# Patient Record
Sex: FEMALE | Race: WHITE | Employment: OTHER | URBAN - METROPOLITAN AREA
[De-identification: names, ages, dates, MRNs, and addresses within clinical notes are randomized per-mention and may not be internally consistent; named-entity substitution may affect disease eponyms.]

---

## 2021-01-01 ENCOUNTER — APPOINTMENT (OUTPATIENT)
Dept: ULTRASOUND IMAGING | Facility: HOSPITAL | Age: 76
End: 2021-01-01
Attending: HOSPITALIST
Payer: MEDICARE

## 2021-01-01 ENCOUNTER — PATIENT MESSAGE (OUTPATIENT)
Dept: INTERNAL MEDICINE CLINIC | Facility: CLINIC | Age: 76
End: 2021-01-01

## 2021-01-01 ENCOUNTER — APPOINTMENT (OUTPATIENT)
Dept: CV DIAGNOSTICS | Facility: HOSPITAL | Age: 76
End: 2021-01-01
Attending: INTERNAL MEDICINE
Payer: MEDICARE

## 2021-01-01 ENCOUNTER — MED REC SCAN ONLY (OUTPATIENT)
Dept: INTERNAL MEDICINE CLINIC | Facility: CLINIC | Age: 76
End: 2021-01-01

## 2021-01-01 ENCOUNTER — PATIENT OUTREACH (OUTPATIENT)
Dept: CASE MANAGEMENT | Age: 76
End: 2021-01-01

## 2021-01-01 ENCOUNTER — APPOINTMENT (OUTPATIENT)
Dept: GENERAL RADIOLOGY | Facility: HOSPITAL | Age: 76
End: 2021-01-01
Attending: EMERGENCY MEDICINE
Payer: MEDICARE

## 2021-01-01 ENCOUNTER — APPOINTMENT (OUTPATIENT)
Dept: GENERAL RADIOLOGY | Facility: HOSPITAL | Age: 76
End: 2021-01-01
Attending: INTERNAL MEDICINE
Payer: MEDICARE

## 2021-01-01 ENCOUNTER — TELEPHONE (OUTPATIENT)
Dept: CARDIOLOGY | Age: 76
End: 2021-01-01

## 2021-01-01 ENCOUNTER — HOSPITAL ENCOUNTER (OUTPATIENT)
Facility: HOSPITAL | Age: 76
Setting detail: OBSERVATION
Discharge: HOME HEALTH | End: 2021-01-01
Attending: EMERGENCY MEDICINE | Admitting: HOSPITALIST
Payer: MEDICARE

## 2021-01-01 ENCOUNTER — HOSPITAL ENCOUNTER (OUTPATIENT)
Facility: HOSPITAL | Age: 76
Setting detail: OBSERVATION
Discharge: HOME | End: 2021-01-01
Attending: INTERNAL MEDICINE | Admitting: HOSPITALIST
Payer: MEDICARE

## 2021-01-01 ENCOUNTER — ANTI-COAG (OUTPATIENT)
Dept: CARDIOLOGY | Age: 76
End: 2021-01-01

## 2021-01-01 ENCOUNTER — OFFICE VISIT (OUTPATIENT)
Dept: INTERNAL MEDICINE CLINIC | Facility: CLINIC | Age: 76
End: 2021-01-01
Payer: MEDICARE

## 2021-01-01 ENCOUNTER — TELEPHONE (OUTPATIENT)
Dept: INTERNAL MEDICINE CLINIC | Facility: CLINIC | Age: 76
End: 2021-01-01

## 2021-01-01 VITALS
TEMPERATURE: 98 F | RESPIRATION RATE: 18 BRPM | BODY MASS INDEX: 24 KG/M2 | SYSTOLIC BLOOD PRESSURE: 151 MMHG | HEART RATE: 60 BPM | WEIGHT: 137.56 LBS | OXYGEN SATURATION: 96 % | DIASTOLIC BLOOD PRESSURE: 51 MMHG

## 2021-01-01 VITALS
HEART RATE: 52 BPM | WEIGHT: 142.88 LBS | HEIGHT: 63.5 IN | TEMPERATURE: 98 F | OXYGEN SATURATION: 97 % | BODY MASS INDEX: 25 KG/M2 | RESPIRATION RATE: 14 BRPM | SYSTOLIC BLOOD PRESSURE: 162 MMHG | DIASTOLIC BLOOD PRESSURE: 94 MMHG

## 2021-01-01 VITALS
HEIGHT: 63 IN | HEART RATE: 87 BPM | RESPIRATION RATE: 16 BRPM | OXYGEN SATURATION: 94 % | WEIGHT: 144.38 LBS | SYSTOLIC BLOOD PRESSURE: 161 MMHG | DIASTOLIC BLOOD PRESSURE: 67 MMHG | BODY MASS INDEX: 25.58 KG/M2 | TEMPERATURE: 99 F

## 2021-01-01 DIAGNOSIS — I25.10 CORONARY ARTERY DISEASE INVOLVING NATIVE CORONARY ARTERY OF NATIVE HEART WITHOUT ANGINA PECTORIS: ICD-10-CM

## 2021-01-01 DIAGNOSIS — E03.9 ACQUIRED HYPOTHYROIDISM: ICD-10-CM

## 2021-01-01 DIAGNOSIS — I50.31 ACUTE DIASTOLIC CONGESTIVE HEART FAILURE (HCC): Primary | ICD-10-CM

## 2021-01-01 DIAGNOSIS — E78.5 HYPERLIPIDEMIA, UNSPECIFIED HYPERLIPIDEMIA TYPE: Primary | ICD-10-CM

## 2021-01-01 DIAGNOSIS — Z86.018 HISTORY OF MENINGIOMA: ICD-10-CM

## 2021-01-01 DIAGNOSIS — I51.81 TAKOTSUBO CARDIOMYOPATHY: ICD-10-CM

## 2021-01-01 DIAGNOSIS — Z02.9 ENCOUNTERS FOR UNSPECIFIED ADMINISTRATIVE PURPOSE: ICD-10-CM

## 2021-01-01 DIAGNOSIS — D51.8 OTHER VITAMIN B12 DEFICIENCY ANEMIA: ICD-10-CM

## 2021-01-01 DIAGNOSIS — I10 ESSENTIAL HYPERTENSION: ICD-10-CM

## 2021-01-01 DIAGNOSIS — I50.9 ACUTE CONGESTIVE HEART FAILURE, UNSPECIFIED HEART FAILURE TYPE (HCC): Primary | ICD-10-CM

## 2021-01-01 DIAGNOSIS — F03.90 DEMENTIA WITHOUT BEHAVIORAL DISTURBANCE, UNSPECIFIED DEMENTIA TYPE (HCC): ICD-10-CM

## 2021-01-01 DIAGNOSIS — G62.9 NEUROPATHY: ICD-10-CM

## 2021-01-01 DIAGNOSIS — Z79.01 LONG TERM (CURRENT) USE OF ANTICOAGULANTS: Primary | ICD-10-CM

## 2021-01-01 DIAGNOSIS — E11.9 TYPE 2 DIABETES MELLITUS WITHOUT COMPLICATION, WITHOUT LONG-TERM CURRENT USE OF INSULIN (HCC): ICD-10-CM

## 2021-01-01 DIAGNOSIS — F32.4 MAJOR DEPRESSIVE DISORDER IN PARTIAL REMISSION, UNSPECIFIED WHETHER RECURRENT (HCC): ICD-10-CM

## 2021-01-01 DIAGNOSIS — I87.2 VENOUS INSUFFICIENCY: ICD-10-CM

## 2021-01-01 DIAGNOSIS — I35.1 NONRHEUMATIC AORTIC VALVE INSUFFICIENCY: ICD-10-CM

## 2021-01-01 DIAGNOSIS — I50.9 CONGESTIVE HEART FAILURE, UNSPECIFIED HF CHRONICITY, UNSPECIFIED HEART FAILURE TYPE (HCC): Primary | ICD-10-CM

## 2021-01-01 DIAGNOSIS — I48.0 PAF (PAROXYSMAL ATRIAL FIBRILLATION) (HCC): ICD-10-CM

## 2021-01-01 DIAGNOSIS — Z79.01 LONG TERM (CURRENT) USE OF ANTICOAGULANTS: ICD-10-CM

## 2021-01-01 DIAGNOSIS — Z23 NEED FOR VACCINATION: ICD-10-CM

## 2021-01-01 DIAGNOSIS — E11.42 TYPE 2 DIABETES MELLITUS WITH DIABETIC POLYNEUROPATHY, WITHOUT LONG-TERM CURRENT USE OF INSULIN (HCC): ICD-10-CM

## 2021-01-01 DIAGNOSIS — G20 PARKINSON'S DISEASE (HCC): ICD-10-CM

## 2021-01-01 DIAGNOSIS — I35.8 AORTIC VALVE SCLEROSIS: ICD-10-CM

## 2021-01-01 DIAGNOSIS — I51.89 DIASTOLIC DYSFUNCTION: ICD-10-CM

## 2021-01-01 DIAGNOSIS — E11.59 TYPE 2 DIABETES MELLITUS WITH OTHER CIRCULATORY COMPLICATION, WITHOUT LONG-TERM CURRENT USE OF INSULIN (HCC): ICD-10-CM

## 2021-01-01 LAB
ALBUMIN SERPL-MCNC: 3.2 G/DL (ref 3.4–5)
ALBUMIN SERPL-MCNC: 3.4 G/DL (ref 3.4–5)
ALBUMIN/GLOB SERPL: 0.8 {RATIO} (ref 1–2)
ALBUMIN/GLOB SERPL: 0.8 {RATIO} (ref 1–2)
ALP LIVER SERPL-CCNC: 113 U/L
ALP LIVER SERPL-CCNC: 88 U/L
ALT SERPL-CCNC: 15 U/L
ALT SERPL-CCNC: 15 U/L
ANION GAP SERPL CALC-SCNC: 10 MMOL/L (ref 0–18)
ANION GAP SERPL CALC-SCNC: 10 MMOL/L (ref 0–18)
ANION GAP SERPL CALC-SCNC: 11 MMOL/L (ref 0–18)
ANION GAP SERPL CALC-SCNC: 12 MMOL/L (ref 0–18)
ANION GAP SERPL CALC-SCNC: 12 MMOL/L (ref 0–18)
ANION GAP SERPL CALC-SCNC: 8 MMOL/L (ref 0–18)
ANION GAP SERPL CALC-SCNC: 9 MMOL/L (ref 0–18)
APTT PPP: 39 SECONDS (ref 25.4–36.1)
AST SERPL-CCNC: 15 U/L (ref 15–37)
AST SERPL-CCNC: 18 U/L (ref 15–37)
ATRIAL RATE: 129 BPM
ATRIAL RATE: 57 BPM
ATRIAL RATE: 72 BPM
ATRIAL RATE: 81 BPM
ATRIAL RATE: 91 BPM
ATRIAL RATE: 97 BPM
BASOPHILS # BLD AUTO: 0.03 X10(3) UL (ref 0–0.2)
BASOPHILS # BLD AUTO: 0.17 X10(3) UL (ref 0–0.2)
BASOPHILS # BLD AUTO: 0.21 X10(3) UL (ref 0–0.2)
BASOPHILS NFR BLD AUTO: 0.3 %
BASOPHILS NFR BLD AUTO: 1.3 %
BASOPHILS NFR BLD AUTO: 1.6 %
BILIRUB SERPL-MCNC: 0.3 MG/DL (ref 0.1–2)
BILIRUB SERPL-MCNC: 0.4 MG/DL (ref 0.1–2)
BILIRUB UR QL STRIP.AUTO: NEGATIVE
BUN BLD-MCNC: 21 MG/DL (ref 7–18)
BUN BLD-MCNC: 22 MG/DL (ref 7–18)
BUN BLD-MCNC: 23 MG/DL (ref 7–18)
BUN BLD-MCNC: 25 MG/DL (ref 7–18)
BUN BLD-MCNC: 26 MG/DL (ref 7–18)
BUN/CREAT SERPL: 11.2 (ref 10–20)
BUN/CREAT SERPL: 11.3 (ref 10–20)
BUN/CREAT SERPL: 11.8 (ref 10–20)
BUN/CREAT SERPL: 12.5 (ref 10–20)
BUN/CREAT SERPL: 12.6 (ref 10–20)
BUN/CREAT SERPL: 15.7 (ref 10–20)
BUN/CREAT SERPL: 16 (ref 10–20)
CALCIUM BLD-MCNC: 8.2 MG/DL (ref 8.5–10.1)
CALCIUM BLD-MCNC: 8.4 MG/DL (ref 8.5–10.1)
CALCIUM BLD-MCNC: 8.5 MG/DL (ref 8.5–10.1)
CALCIUM BLD-MCNC: 8.8 MG/DL (ref 8.5–10.1)
CALCIUM BLD-MCNC: 8.9 MG/DL (ref 8.5–10.1)
CHLORIDE SERPL-SCNC: 92 MMOL/L (ref 98–112)
CHLORIDE SERPL-SCNC: 92 MMOL/L (ref 98–112)
CHLORIDE SERPL-SCNC: 95 MMOL/L (ref 98–112)
CHLORIDE SERPL-SCNC: 96 MMOL/L (ref 98–112)
CHLORIDE SERPL-SCNC: 98 MMOL/L (ref 98–112)
CO2 SERPL-SCNC: 18 MMOL/L (ref 21–32)
CO2 SERPL-SCNC: 21 MMOL/L (ref 21–32)
CO2 SERPL-SCNC: 22 MMOL/L (ref 21–32)
CO2 SERPL-SCNC: 23 MMOL/L (ref 21–32)
CO2 SERPL-SCNC: 24 MMOL/L (ref 21–32)
CO2 SERPL-SCNC: 26 MMOL/L (ref 21–32)
CO2 SERPL-SCNC: 26 MMOL/L (ref 21–32)
COLOR UR AUTO: YELLOW
CREAT BLD-MCNC: 1.59 MG/DL
CREAT BLD-MCNC: 1.63 MG/DL
CREAT BLD-MCNC: 1.74 MG/DL
CREAT BLD-MCNC: 1.84 MG/DL
CREAT BLD-MCNC: 1.87 MG/DL
CREAT BLD-MCNC: 1.95 MG/DL
CREAT BLD-MCNC: 2.03 MG/DL
DEPRECATED RDW RBC AUTO: 42.4 FL (ref 35.1–46.3)
DEPRECATED RDW RBC AUTO: 44.4 FL (ref 35.1–46.3)
DEPRECATED RDW RBC AUTO: 46.1 FL (ref 35.1–46.3)
EOSINOPHIL # BLD AUTO: 0 X10(3) UL (ref 0–0.7)
EOSINOPHIL # BLD AUTO: 0.69 X10(3) UL (ref 0–0.7)
EOSINOPHIL # BLD AUTO: 0.91 X10(3) UL (ref 0–0.7)
EOSINOPHIL NFR BLD AUTO: 0 %
EOSINOPHIL NFR BLD AUTO: 5.4 %
EOSINOPHIL NFR BLD AUTO: 7 %
ERYTHROCYTE [DISTWIDTH] IN BLOOD BY AUTOMATED COUNT: 12.8 % (ref 11–15)
ERYTHROCYTE [DISTWIDTH] IN BLOOD BY AUTOMATED COUNT: 13.2 % (ref 11–15)
ERYTHROCYTE [DISTWIDTH] IN BLOOD BY AUTOMATED COUNT: 13.6 % (ref 11–15)
EST. AVERAGE GLUCOSE BLD GHB EST-MCNC: 123 MG/DL (ref 68–126)
GLOBULIN PLAS-MCNC: 4 G/DL (ref 2.8–4.4)
GLOBULIN PLAS-MCNC: 4.1 G/DL (ref 2.8–4.4)
GLUCOSE BLD-MCNC: 100 MG/DL (ref 70–99)
GLUCOSE BLD-MCNC: 103 MG/DL (ref 70–99)
GLUCOSE BLD-MCNC: 104 MG/DL (ref 70–99)
GLUCOSE BLD-MCNC: 105 MG/DL (ref 70–99)
GLUCOSE BLD-MCNC: 107 MG/DL (ref 70–99)
GLUCOSE BLD-MCNC: 108 MG/DL (ref 70–99)
GLUCOSE BLD-MCNC: 112 MG/DL (ref 70–99)
GLUCOSE BLD-MCNC: 114 MG/DL (ref 70–99)
GLUCOSE BLD-MCNC: 115 MG/DL (ref 70–99)
GLUCOSE BLD-MCNC: 121 MG/DL (ref 70–99)
GLUCOSE BLD-MCNC: 122 MG/DL (ref 70–99)
GLUCOSE BLD-MCNC: 124 MG/DL (ref 70–99)
GLUCOSE BLD-MCNC: 125 MG/DL (ref 70–99)
GLUCOSE BLD-MCNC: 130 MG/DL (ref 70–99)
GLUCOSE BLD-MCNC: 132 MG/DL (ref 70–99)
GLUCOSE BLD-MCNC: 132 MG/DL (ref 70–99)
GLUCOSE BLD-MCNC: 134 MG/DL (ref 70–99)
GLUCOSE BLD-MCNC: 136 MG/DL (ref 70–99)
GLUCOSE BLD-MCNC: 138 MG/DL (ref 70–99)
GLUCOSE BLD-MCNC: 140 MG/DL (ref 70–99)
GLUCOSE BLD-MCNC: 143 MG/DL (ref 70–99)
GLUCOSE BLD-MCNC: 152 MG/DL (ref 70–99)
GLUCOSE BLD-MCNC: 154 MG/DL (ref 70–99)
GLUCOSE BLD-MCNC: 161 MG/DL (ref 70–99)
GLUCOSE BLD-MCNC: 168 MG/DL (ref 70–99)
GLUCOSE BLD-MCNC: 175 MG/DL (ref 70–99)
GLUCOSE BLD-MCNC: 181 MG/DL (ref 70–99)
GLUCOSE BLD-MCNC: 182 MG/DL (ref 70–99)
GLUCOSE BLD-MCNC: 93 MG/DL (ref 70–99)
GLUCOSE BLD-MCNC: 96 MG/DL (ref 70–99)
GLUCOSE BLD-MCNC: 97 MG/DL (ref 70–99)
GLUCOSE UR STRIP.AUTO-MCNC: NEGATIVE MG/DL
HAV IGM SER QL: 2.1 MG/DL (ref 1.6–2.6)
HAV IGM SER QL: 2.1 MG/DL (ref 1.6–2.6)
HBA1C MFR BLD HPLC: 5.9 % (ref ?–5.7)
HCT VFR BLD AUTO: 27.9 %
HCT VFR BLD AUTO: 29.7 %
HCT VFR BLD AUTO: 30.1 %
HGB BLD-MCNC: 10 G/DL
HGB BLD-MCNC: 10.1 G/DL
HGB BLD-MCNC: 9.2 G/DL
IMM GRANULOCYTES # BLD AUTO: 0.02 X10(3) UL (ref 0–1)
IMM GRANULOCYTES # BLD AUTO: 0.07 X10(3) UL (ref 0–1)
IMM GRANULOCYTES # BLD AUTO: 0.07 X10(3) UL (ref 0–1)
IMM GRANULOCYTES NFR BLD: 0.2 %
IMM GRANULOCYTES NFR BLD: 0.5 %
IMM GRANULOCYTES NFR BLD: 0.6 %
INR BLD: 1.78 (ref 0.89–1.11)
INR BLD: 1.87 (ref 0.89–1.11)
INR BLD: 2.02 (ref 0.89–1.11)
INR BLD: 2.22 (ref 0.89–1.11)
INR BLD: 2.35 (ref 0.89–1.11)
INR BLD: 2.98 (ref 0.89–1.11)
INR BLD: 3.08 (ref 0.89–1.11)
INR PPP: 2
INR PPP: 2.35
KETONES UR STRIP.AUTO-MCNC: NEGATIVE MG/DL
LYMPHOCYTES # BLD AUTO: 1.26 X10(3) UL (ref 1–4)
LYMPHOCYTES # BLD AUTO: 2.09 X10(3) UL (ref 1–4)
LYMPHOCYTES # BLD AUTO: 2.62 X10(3) UL (ref 1–4)
LYMPHOCYTES NFR BLD AUTO: 10.9 %
LYMPHOCYTES NFR BLD AUTO: 16.2 %
LYMPHOCYTES NFR BLD AUTO: 20.2 %
M PROTEIN MFR SERPL ELPH: 7.2 G/DL (ref 6.4–8.2)
M PROTEIN MFR SERPL ELPH: 7.5 G/DL (ref 6.4–8.2)
MCH RBC QN AUTO: 30.5 PG (ref 26–34)
MCH RBC QN AUTO: 30.7 PG (ref 26–34)
MCH RBC QN AUTO: 30.7 PG (ref 26–34)
MCHC RBC AUTO-ENTMCNC: 33 G/DL (ref 31–37)
MCHC RBC AUTO-ENTMCNC: 33.6 G/DL (ref 31–37)
MCHC RBC AUTO-ENTMCNC: 33.7 G/DL (ref 31–37)
MCV RBC AUTO: 91.1 FL
MCV RBC AUTO: 91.5 FL
MCV RBC AUTO: 92.4 FL
MONOCYTES # BLD AUTO: 0.75 X10(3) UL (ref 0.1–1)
MONOCYTES # BLD AUTO: 1.05 X10(3) UL (ref 0.1–1)
MONOCYTES # BLD AUTO: 1.27 X10(3) UL (ref 0.1–1)
MONOCYTES NFR BLD AUTO: 6.5 %
MONOCYTES NFR BLD AUTO: 8.1 %
MONOCYTES NFR BLD AUTO: 9.9 %
NEUTROPHILS # BLD AUTO: 8.16 X10 (3) UL (ref 1.5–7.7)
NEUTROPHILS # BLD AUTO: 8.16 X10(3) UL (ref 1.5–7.7)
NEUTROPHILS # BLD AUTO: 8.59 X10 (3) UL (ref 1.5–7.7)
NEUTROPHILS # BLD AUTO: 8.59 X10(3) UL (ref 1.5–7.7)
NEUTROPHILS # BLD AUTO: 9.44 X10 (3) UL (ref 1.5–7.7)
NEUTROPHILS # BLD AUTO: 9.44 X10(3) UL (ref 1.5–7.7)
NEUTROPHILS NFR BLD AUTO: 62.9 %
NEUTROPHILS NFR BLD AUTO: 66.7 %
NEUTROPHILS NFR BLD AUTO: 81.7 %
NITRITE UR QL STRIP.AUTO: NEGATIVE
NT-PROBNP SERPL-MCNC: 5391 PG/ML (ref ?–125)
NT-PROBNP SERPL-MCNC: 8573 PG/ML (ref ?–125)
OSMOLALITY SERPL CALC.SUM OF ELEC: 265 MOSM/KG (ref 275–295)
OSMOLALITY SERPL CALC.SUM OF ELEC: 270 MOSM/KG (ref 275–295)
OSMOLALITY SERPL CALC.SUM OF ELEC: 271 MOSM/KG (ref 275–295)
OSMOLALITY SERPL CALC.SUM OF ELEC: 271 MOSM/KG (ref 275–295)
OSMOLALITY SERPL CALC.SUM OF ELEC: 273 MOSM/KG (ref 275–295)
P AXIS: 56 DEGREES
P AXIS: 57 DEGREES
P AXIS: 59 DEGREES
P AXIS: 68 DEGREES
P-R INTERVAL: 158 MS
P-R INTERVAL: 174 MS
P-R INTERVAL: 176 MS
P-R INTERVAL: 190 MS
PH UR STRIP.AUTO: 6 [PH] (ref 5–8)
PLATELET # BLD AUTO: 458 10(3)UL (ref 150–450)
PLATELET # BLD AUTO: 468 10(3)UL (ref 150–450)
PLATELET # BLD AUTO: 548 10(3)UL (ref 150–450)
POTASSIUM SERPL-SCNC: 3 MMOL/L (ref 3.5–5.1)
POTASSIUM SERPL-SCNC: 3.1 MMOL/L (ref 3.5–5.1)
POTASSIUM SERPL-SCNC: 3.1 MMOL/L (ref 3.5–5.1)
POTASSIUM SERPL-SCNC: 3.3 MMOL/L (ref 3.5–5.1)
POTASSIUM SERPL-SCNC: 3.4 MMOL/L (ref 3.5–5.1)
POTASSIUM SERPL-SCNC: 3.7 MMOL/L (ref 3.5–5.1)
POTASSIUM SERPL-SCNC: 3.8 MMOL/L (ref 3.5–5.1)
POTASSIUM SERPL-SCNC: 3.9 MMOL/L (ref 3.5–5.1)
POTASSIUM SERPL-SCNC: 3.9 MMOL/L (ref 3.5–5.1)
PROCALCITONIN SERPL-MCNC: 0.05 NG/ML (ref ?–0.16)
PROCALCITONIN SERPL-MCNC: <0.05 NG/ML (ref ?–0.16)
PROT UR STRIP.AUTO-MCNC: >=500 MG/DL
PSA SERPL DL<=0.01 NG/ML-MCNC: 21.2 SECONDS (ref 12.4–14.6)
PSA SERPL DL<=0.01 NG/ML-MCNC: 22 SECONDS (ref 12.4–14.6)
PSA SERPL DL<=0.01 NG/ML-MCNC: 23.4 SECONDS (ref 12.4–14.6)
PSA SERPL DL<=0.01 NG/ML-MCNC: 25.2 SECONDS (ref 12.4–14.6)
PSA SERPL DL<=0.01 NG/ML-MCNC: 26.3 SECONDS (ref 12.4–14.6)
PSA SERPL DL<=0.01 NG/ML-MCNC: 31.7 SECONDS (ref 12.4–14.6)
PSA SERPL DL<=0.01 NG/ML-MCNC: 32.5 SECONDS (ref 12.4–14.6)
Q-T INTERVAL: 306 MS
Q-T INTERVAL: 346 MS
Q-T INTERVAL: 356 MS
Q-T INTERVAL: 408 MS
Q-T INTERVAL: 448 MS
Q-T INTERVAL: 518 MS
QRS DURATION: 86 MS
QRS DURATION: 86 MS
QRS DURATION: 90 MS
QRS DURATION: 90 MS
QRS DURATION: 94 MS
QRS DURATION: 94 MS
QTC CALCULATION (BEZET): 425 MS
QTC CALCULATION (BEZET): 455 MS
QTC CALCULATION (BEZET): 473 MS
QTC CALCULATION (BEZET): 490 MS
QTC CALCULATION (BEZET): 500 MS
QTC CALCULATION (BEZET): 504 MS
R AXIS: 54 DEGREES
R AXIS: 56 DEGREES
R AXIS: 61 DEGREES
R AXIS: 63 DEGREES
R AXIS: 64 DEGREES
R AXIS: 64 DEGREES
RBC # BLD AUTO: 3.02 X10(6)UL
RBC # BLD AUTO: 3.26 X10(6)UL
RBC # BLD AUTO: 3.29 X10(6)UL
RBC UR QL AUTO: NEGATIVE
SARS-COV-2 RNA RESP QL NAA+PROBE: NOT DETECTED
SARS-COV-2 RNA RESP QL NAA+PROBE: NOT DETECTED
SODIUM SERPL-SCNC: 126 MMOL/L (ref 136–145)
SODIUM SERPL-SCNC: 126 MMOL/L (ref 136–145)
SODIUM SERPL-SCNC: 128 MMOL/L (ref 136–145)
SODIUM SERPL-SCNC: 128 MMOL/L (ref 136–145)
SODIUM SERPL-SCNC: 129 MMOL/L (ref 136–145)
SP GR UR STRIP.AUTO: 1.01 (ref 1–1.03)
T AXIS: -69 DEGREES
T AXIS: -79 DEGREES
T AXIS: 40 DEGREES
T AXIS: 41 DEGREES
T AXIS: 50 DEGREES
T AXIS: 65 DEGREES
TROPONIN I SERPL-MCNC: <0.045 NG/ML (ref ?–0.04)
UROBILINOGEN UR STRIP.AUTO-MCNC: <2 MG/DL
VENTRICULAR RATE: 119 BPM
VENTRICULAR RATE: 133 BPM
VENTRICULAR RATE: 57 BPM
VENTRICULAR RATE: 72 BPM
VENTRICULAR RATE: 81 BPM
VENTRICULAR RATE: 91 BPM
WBC # BLD AUTO: 11.6 X10(3) UL (ref 4–11)
WBC # BLD AUTO: 12.9 X10(3) UL (ref 4–11)
WBC # BLD AUTO: 13 X10(3) UL (ref 4–11)

## 2021-01-01 PROCEDURE — 3077F SYST BP >= 140 MM HG: CPT | Performed by: INTERNAL MEDICINE

## 2021-01-01 PROCEDURE — 99225 SUBSEQUENT OBSERVATION CARE: CPT | Performed by: INTERNAL MEDICINE

## 2021-01-01 PROCEDURE — 99214 OFFICE O/P EST MOD 30 MIN: CPT | Performed by: INTERNAL MEDICINE

## 2021-01-01 PROCEDURE — 76700 US EXAM ABDOM COMPLETE: CPT | Performed by: HOSPITALIST

## 2021-01-01 PROCEDURE — 3008F BODY MASS INDEX DOCD: CPT | Performed by: INTERNAL MEDICINE

## 2021-01-01 PROCEDURE — 99217 OBSERVATION CARE DISCHARGE: CPT | Performed by: HOSPITALIST

## 2021-01-01 PROCEDURE — 1111F DSCHRG MED/CURRENT MED MERGE: CPT

## 2021-01-01 PROCEDURE — 71045 X-RAY EXAM CHEST 1 VIEW: CPT | Performed by: EMERGENCY MEDICINE

## 2021-01-01 PROCEDURE — 99217 OBSERVATION CARE DISCHARGE: CPT | Performed by: INTERNAL MEDICINE

## 2021-01-01 PROCEDURE — 99226 SUBSEQUENT OBSERVATION CARE: CPT | Performed by: HOSPITALIST

## 2021-01-01 PROCEDURE — 93306 TTE W/DOPPLER COMPLETE: CPT | Performed by: INTERNAL MEDICINE

## 2021-01-01 PROCEDURE — 99225 SUBSEQUENT OBSERVATION CARE: CPT | Performed by: HOSPITALIST

## 2021-01-01 PROCEDURE — 99205 OFFICE O/P NEW HI 60 MIN: CPT | Performed by: INTERNAL MEDICINE

## 2021-01-01 PROCEDURE — 3080F DIAST BP >= 90 MM HG: CPT | Performed by: INTERNAL MEDICINE

## 2021-01-01 PROCEDURE — 99220 INITIAL OBSERVATION CARE,LEVL III: CPT | Performed by: HOSPITALIST

## 2021-01-01 PROCEDURE — 99215 OFFICE O/P EST HI 40 MIN: CPT | Performed by: INTERNAL MEDICINE

## 2021-01-01 PROCEDURE — 99220 INITIAL OBSERVATION CARE,LEVL III: CPT | Performed by: INTERNAL MEDICINE

## 2021-01-01 PROCEDURE — 74230 X-RAY XM SWLNG FUNCJ C+: CPT | Performed by: INTERNAL MEDICINE

## 2021-01-01 RX ORDER — POLYETHYLENE GLYCOL 3350 17 G/17G
17 POWDER, FOR SOLUTION ORAL DAILY PRN
Status: DISCONTINUED | OUTPATIENT
Start: 2021-01-01 | End: 2021-01-01

## 2021-01-01 RX ORDER — EZETIMIBE 10 MG/1
10 TABLET ORAL NIGHTLY
COMMUNITY
Start: 2021-01-01

## 2021-01-01 RX ORDER — QUETIAPINE 25 MG/1
25 TABLET, FILM COATED ORAL NIGHTLY
Status: DISCONTINUED | OUTPATIENT
Start: 2021-01-01 | End: 2021-01-01

## 2021-01-01 RX ORDER — WARFARIN SODIUM 5 MG/1
5 TABLET ORAL
Status: COMPLETED | OUTPATIENT
Start: 2021-01-01 | End: 2021-01-01

## 2021-01-01 RX ORDER — SUCRALFATE ORAL 1 G/10ML
1 SUSPENSION ORAL
Status: DISCONTINUED | OUTPATIENT
Start: 2021-01-01 | End: 2021-01-01

## 2021-01-01 RX ORDER — AMIODARONE HYDROCHLORIDE 200 MG/1
400 TABLET ORAL 2 TIMES DAILY WITH MEALS
Status: COMPLETED | OUTPATIENT
Start: 2021-01-01 | End: 2021-01-01

## 2021-01-01 RX ORDER — RIVASTIGMINE 4.6 MG/24H
1 PATCH, EXTENDED RELEASE TRANSDERMAL DAILY
COMMUNITY
Start: 2021-01-01

## 2021-01-01 RX ORDER — DOCUSATE SODIUM 100 MG/1
1 CAPSULE, LIQUID FILLED ORAL NIGHTLY
COMMUNITY

## 2021-01-01 RX ORDER — BUSPIRONE HYDROCHLORIDE 7.5 MG/1
1 TABLET ORAL 3 TIMES DAILY
COMMUNITY

## 2021-01-01 RX ORDER — DILTIAZEM HYDROCHLORIDE 5 MG/ML
10 INJECTION INTRAVENOUS ONCE
Status: COMPLETED | OUTPATIENT
Start: 2021-01-01 | End: 2021-01-01

## 2021-01-01 RX ORDER — WARFARIN SODIUM 2.5 MG/1
2.5 TABLET ORAL
Status: DISCONTINUED | OUTPATIENT
Start: 2021-01-01 | End: 2021-01-01

## 2021-01-01 RX ORDER — HYDROCODONE BITARTRATE AND ACETAMINOPHEN 5; 325 MG/1; MG/1
1 TABLET ORAL ONCE
Status: COMPLETED | OUTPATIENT
Start: 2021-01-01 | End: 2021-01-01

## 2021-01-01 RX ORDER — HYDRALAZINE HYDROCHLORIDE 50 MG/1
50 TABLET, FILM COATED ORAL EVERY 8 HOURS SCHEDULED
Status: DISCONTINUED | OUTPATIENT
Start: 2021-01-01 | End: 2021-01-01

## 2021-01-01 RX ORDER — PANTOPRAZOLE SODIUM 20 MG/1
20 TABLET, DELAYED RELEASE ORAL
Status: DISCONTINUED | OUTPATIENT
Start: 2021-01-01 | End: 2021-01-01

## 2021-01-01 RX ORDER — AMIODARONE HYDROCHLORIDE 200 MG/1
200 TABLET ORAL DAILY
Status: DISCONTINUED | OUTPATIENT
Start: 2021-01-01 | End: 2021-01-01

## 2021-01-01 RX ORDER — MAGNESIUM OXIDE 400 MG (241.3 MG MAGNESIUM) TABLET
400 TABLET DAILY
Status: DISCONTINUED | OUTPATIENT
Start: 2021-01-01 | End: 2021-01-01

## 2021-01-01 RX ORDER — GABAPENTIN 100 MG/1
100 CAPSULE ORAL EVERY MORNING
Status: DISCONTINUED | OUTPATIENT
Start: 2021-01-01 | End: 2021-01-01

## 2021-01-01 RX ORDER — AMIODARONE HYDROCHLORIDE 200 MG/1
200 TABLET ORAL DAILY
Qty: 30 TABLET | Refills: 3 | Status: SHIPPED | OUTPATIENT
Start: 2021-01-01

## 2021-01-01 RX ORDER — FUROSEMIDE 10 MG/ML
60 INJECTION INTRAMUSCULAR; INTRAVENOUS
Status: DISCONTINUED | OUTPATIENT
Start: 2021-01-01 | End: 2021-01-01

## 2021-01-01 RX ORDER — ASPIRIN 81 MG/1
1 TABLET ORAL DAILY
COMMUNITY

## 2021-01-01 RX ORDER — POTASSIUM CHLORIDE 20 MEQ/1
40 TABLET, EXTENDED RELEASE ORAL EVERY 4 HOURS
Status: COMPLETED | OUTPATIENT
Start: 2021-01-01 | End: 2021-01-01

## 2021-01-01 RX ORDER — GLIPIZIDE 2.5 MG/1
2.5 TABLET, EXTENDED RELEASE ORAL 2 TIMES DAILY
COMMUNITY

## 2021-01-01 RX ORDER — FUROSEMIDE 20 MG/1
20 TABLET ORAL DAILY
Qty: 30 TABLET | Refills: 1 | Status: SHIPPED | OUTPATIENT
Start: 2021-01-01

## 2021-01-01 RX ORDER — AMLODIPINE BESYLATE 10 MG/1
10 TABLET ORAL DAILY
COMMUNITY
Start: 2021-01-01

## 2021-01-01 RX ORDER — BISACODYL 10 MG
10 SUPPOSITORY, RECTAL RECTAL
Status: DISCONTINUED | OUTPATIENT
Start: 2021-01-01 | End: 2021-01-01

## 2021-01-01 RX ORDER — ASPIRIN 81 MG/1
81 TABLET ORAL DAILY
Status: DISCONTINUED | OUTPATIENT
Start: 2021-01-01 | End: 2021-01-01

## 2021-01-01 RX ORDER — POTASSIUM CHLORIDE 20 MEQ/1
40 TABLET, EXTENDED RELEASE ORAL EVERY 4 HOURS
Status: DISPENSED | OUTPATIENT
Start: 2021-01-01 | End: 2021-01-01

## 2021-01-01 RX ORDER — DEXTROSE MONOHYDRATE 25 G/50ML
50 INJECTION, SOLUTION INTRAVENOUS
Status: DISCONTINUED | OUTPATIENT
Start: 2021-01-01 | End: 2021-01-01

## 2021-01-01 RX ORDER — MELATONIN
800 DAILY
Status: DISCONTINUED | OUTPATIENT
Start: 2021-01-01 | End: 2021-01-01

## 2021-01-01 RX ORDER — METOPROLOL SUCCINATE AND HYDROCHLOROTHIAZIDE 12.5; 5 MG/1; MG/1
1 TABLET ORAL NIGHTLY
Status: DISCONTINUED | OUTPATIENT
Start: 2021-01-01 | End: 2021-01-01 | Stop reason: RX

## 2021-01-01 RX ORDER — EZETIMIBE 10 MG/1
10 TABLET ORAL NIGHTLY
Status: DISCONTINUED | OUTPATIENT
Start: 2021-01-01 | End: 2021-01-01

## 2021-01-01 RX ORDER — HYDRALAZINE HYDROCHLORIDE 50 MG/1
50 TABLET, FILM COATED ORAL EVERY 8 HOURS SCHEDULED
Qty: 90 TABLET | Refills: 3 | Status: SHIPPED | OUTPATIENT
Start: 2021-01-01

## 2021-01-01 RX ORDER — FUROSEMIDE 10 MG/ML
40 INJECTION INTRAMUSCULAR; INTRAVENOUS DAILY
Status: DISCONTINUED | OUTPATIENT
Start: 2021-01-01 | End: 2021-01-01

## 2021-01-01 RX ORDER — FUROSEMIDE 10 MG/ML
40 INJECTION INTRAMUSCULAR; INTRAVENOUS ONCE
Status: COMPLETED | OUTPATIENT
Start: 2021-01-01 | End: 2021-01-01

## 2021-01-01 RX ORDER — AMLODIPINE BESYLATE 5 MG/1
10 TABLET ORAL DAILY
Status: DISCONTINUED | OUTPATIENT
Start: 2021-01-01 | End: 2021-01-01

## 2021-01-01 RX ORDER — MONTELUKAST SODIUM 10 MG/1
10 TABLET ORAL NIGHTLY
Status: DISCONTINUED | OUTPATIENT
Start: 2021-01-01 | End: 2021-01-01

## 2021-01-01 RX ORDER — ONDANSETRON 2 MG/ML
4 INJECTION INTRAMUSCULAR; INTRAVENOUS EVERY 6 HOURS PRN
Status: DISCONTINUED | OUTPATIENT
Start: 2021-01-01 | End: 2021-01-01

## 2021-01-01 RX ORDER — MAGNESIUM OXIDE 400 MG (241.3 MG MAGNESIUM) TABLET
1 TABLET DAILY
COMMUNITY

## 2021-01-01 RX ORDER — POTASSIUM CHLORIDE 20 MEQ/1
40 TABLET, EXTENDED RELEASE ORAL ONCE
Status: COMPLETED | OUTPATIENT
Start: 2021-01-01 | End: 2021-01-01

## 2021-01-01 RX ORDER — FUROSEMIDE 10 MG/ML
20 INJECTION INTRAMUSCULAR; INTRAVENOUS
Status: DISCONTINUED | OUTPATIENT
Start: 2021-01-01 | End: 2021-01-01

## 2021-01-01 RX ORDER — HYDROCHLOROTHIAZIDE 12.5 MG/1
12.5 CAPSULE, GELATIN COATED ORAL DAILY
Status: DISCONTINUED | OUTPATIENT
Start: 2021-01-01 | End: 2021-01-01

## 2021-01-01 RX ORDER — BUSPIRONE HYDROCHLORIDE 15 MG/1
7.5 TABLET ORAL 3 TIMES DAILY
Status: DISCONTINUED | OUTPATIENT
Start: 2021-01-01 | End: 2021-01-01

## 2021-01-01 RX ORDER — MAGNESIUM OXIDE 400 MG (241.3 MG MAGNESIUM) TABLET
1 TABLET NIGHTLY PRN
Status: DISCONTINUED | OUTPATIENT
Start: 2021-01-01 | End: 2021-01-01

## 2021-01-01 RX ORDER — LEVOTHYROXINE SODIUM 0.05 MG/1
50 TABLET ORAL
Status: DISCONTINUED | OUTPATIENT
Start: 2021-01-01 | End: 2021-01-01

## 2021-01-01 RX ORDER — DOCUSATE SODIUM 100 MG/1
100 CAPSULE, LIQUID FILLED ORAL NIGHTLY
Status: DISCONTINUED | OUTPATIENT
Start: 2021-01-01 | End: 2021-01-01

## 2021-01-01 RX ORDER — ACETAMINOPHEN 500 MG
500 TABLET ORAL EVERY 6 HOURS PRN
Status: DISCONTINUED | OUTPATIENT
Start: 2021-01-01 | End: 2021-01-01

## 2021-01-01 RX ORDER — RIVASTIGMINE 4.6 MG/24H
1 PATCH, EXTENDED RELEASE TRANSDERMAL DAILY
Status: DISCONTINUED | OUTPATIENT
Start: 2021-01-01 | End: 2021-01-01

## 2021-01-01 RX ORDER — GABAPENTIN 300 MG/1
300 CAPSULE ORAL NIGHTLY
Status: DISCONTINUED | OUTPATIENT
Start: 2021-01-01 | End: 2021-01-01

## 2021-01-01 RX ORDER — LORAZEPAM 2 MG/ML
0.5 INJECTION INTRAMUSCULAR 2 TIMES DAILY PRN
Status: DISCONTINUED | OUTPATIENT
Start: 2021-01-01 | End: 2021-01-01

## 2021-01-01 RX ORDER — METOCLOPRAMIDE HYDROCHLORIDE 5 MG/ML
5 INJECTION INTRAMUSCULAR; INTRAVENOUS EVERY 8 HOURS PRN
Status: DISCONTINUED | OUTPATIENT
Start: 2021-01-01 | End: 2021-01-01

## 2021-01-01 RX ORDER — POTASSIUM CHLORIDE 1.5 G/1.77G
40 POWDER, FOR SOLUTION ORAL ONCE
Status: COMPLETED | OUTPATIENT
Start: 2021-01-01 | End: 2021-01-01

## 2021-01-01 RX ORDER — OMEPRAZOLE 20 MG/1
20 CAPSULE, DELAYED RELEASE ORAL 2 TIMES DAILY
COMMUNITY
Start: 2021-01-01

## 2021-01-01 RX ORDER — ESCITALOPRAM OXALATE 10 MG/1
15 TABLET ORAL DAILY
COMMUNITY
Start: 2021-01-01

## 2021-01-01 RX ORDER — LEVOTHYROXINE SODIUM 0.05 MG/1
50 TABLET ORAL DAILY
COMMUNITY
Start: 2021-01-01

## 2021-01-01 RX ORDER — MONTELUKAST SODIUM 10 MG/1
1 TABLET ORAL NIGHTLY
COMMUNITY

## 2021-01-01 RX ORDER — POTASSIUM CHLORIDE 20 MEQ/1
40 TABLET, EXTENDED RELEASE ORAL EVERY 4 HOURS
Status: DISCONTINUED | OUTPATIENT
Start: 2021-01-01 | End: 2021-01-01

## 2021-01-01 RX ORDER — POTASSIUM CHLORIDE 14.9 MG/ML
20 INJECTION INTRAVENOUS ONCE
Status: COMPLETED | OUTPATIENT
Start: 2021-01-01 | End: 2021-01-01

## 2021-01-01 RX ORDER — WARFARIN SODIUM 5 MG/1
5 TABLET ORAL NIGHTLY
Status: DISCONTINUED | OUTPATIENT
Start: 2021-01-01 | End: 2021-01-01

## 2021-01-01 RX ORDER — ACETAMINOPHEN 325 MG/1
650 TABLET ORAL EVERY 6 HOURS PRN
Status: DISCONTINUED | OUTPATIENT
Start: 2021-01-01 | End: 2021-01-01

## 2021-01-01 RX ORDER — METOPROLOL SUCCINATE AND HYDROCHLOROTHIAZIDE 12.5; 5 MG/1; MG/1
1 TABLET ORAL NIGHTLY
COMMUNITY

## 2021-01-01 RX ORDER — FUROSEMIDE 20 MG/1
20 TABLET ORAL DAILY
Status: DISCONTINUED | OUTPATIENT
Start: 2021-01-01 | End: 2021-01-01

## 2021-01-01 RX ORDER — LEVOTHYROXINE SODIUM 0.05 MG/1
50 TABLET ORAL DAILY
Status: DISCONTINUED | OUTPATIENT
Start: 2021-01-01 | End: 2021-01-01

## 2021-01-01 RX ORDER — AMLODIPINE BESYLATE 5 MG/1
10 TABLET ORAL NIGHTLY
Status: DISCONTINUED | OUTPATIENT
Start: 2021-01-01 | End: 2021-01-01

## 2021-01-01 RX ORDER — ALENDRONATE SODIUM 70 MG/1
70 TABLET ORAL WEEKLY
COMMUNITY
Start: 2021-01-01

## 2021-01-01 RX ORDER — GABAPENTIN 100 MG/1
100 CAPSULE ORAL EVERY MORNING
COMMUNITY
Start: 2021-01-01

## 2021-01-01 RX ORDER — SIMETHICONE 80 MG
80 TABLET,CHEWABLE ORAL 4 TIMES DAILY PRN
Status: DISCONTINUED | OUTPATIENT
Start: 2021-01-01 | End: 2021-01-01

## 2021-01-01 RX ORDER — DILTIAZEM HYDROCHLORIDE 5 MG/ML
10 INJECTION INTRAVENOUS ONCE
Status: DISCONTINUED | OUTPATIENT
Start: 2021-01-01 | End: 2021-01-01

## 2021-01-01 RX ORDER — PROPRANOLOL/HYDROCHLOROTHIAZID 40 MG-25MG
1 TABLET ORAL DAILY
COMMUNITY

## 2021-01-01 RX ORDER — HYDRALAZINE HYDROCHLORIDE 25 MG/1
25 TABLET, FILM COATED ORAL EVERY 8 HOURS SCHEDULED
Status: DISCONTINUED | OUTPATIENT
Start: 2021-01-01 | End: 2021-01-01

## 2021-01-01 RX ORDER — TEMAZEPAM 7.5 MG/1
7.5 CAPSULE ORAL ONCE
Status: COMPLETED | OUTPATIENT
Start: 2021-01-01 | End: 2021-01-01

## 2021-01-01 RX ORDER — DILTIAZEM HYDROCHLORIDE 5 MG/ML
INJECTION INTRAVENOUS
Status: COMPLETED
Start: 2021-01-01 | End: 2021-01-01

## 2021-01-01 RX ORDER — HYDRALAZINE HYDROCHLORIDE 20 MG/ML
10 INJECTION INTRAMUSCULAR; INTRAVENOUS
Status: DISCONTINUED | OUTPATIENT
Start: 2021-01-01 | End: 2021-01-01

## 2021-01-01 RX ORDER — METOPROLOL SUCCINATE 50 MG/1
50 TABLET, EXTENDED RELEASE ORAL DAILY
Status: DISCONTINUED | OUTPATIENT
Start: 2021-01-01 | End: 2021-01-01

## 2021-01-01 RX ORDER — WARFARIN SODIUM 5 MG/1
TABLET ORAL DAILY
COMMUNITY
Start: 2021-01-01

## 2021-03-04 PROBLEM — I51.89 DIASTOLIC DYSFUNCTION: Status: ACTIVE | Noted: 2021-01-01

## 2021-03-04 PROBLEM — D64.9 ABSOLUTE ANEMIA: Status: ACTIVE | Noted: 2021-01-01

## 2021-03-04 PROBLEM — G62.9 NEUROPATHY: Status: ACTIVE | Noted: 2021-01-01

## 2021-03-04 PROBLEM — E11.9 DIABETES MELLITUS, TYPE 2 (HCC): Chronic | Status: ACTIVE | Noted: 2021-01-01

## 2021-03-04 PROBLEM — I48.0 PAF (PAROXYSMAL ATRIAL FIBRILLATION) (HCC): Status: ACTIVE | Noted: 2021-01-01

## 2021-03-04 PROBLEM — G20 PARKINSON'S DISEASE (HCC): Status: ACTIVE | Noted: 2021-01-01

## 2021-03-04 PROBLEM — I35.1 NONRHEUMATIC AORTIC VALVE INSUFFICIENCY: Status: ACTIVE | Noted: 2021-01-01

## 2021-03-04 PROBLEM — F32.4 MAJOR DEPRESSION IN PARTIAL REMISSION (HCC): Chronic | Status: ACTIVE | Noted: 2021-01-01

## 2021-03-04 PROBLEM — E03.9 ACQUIRED HYPOTHYROIDISM: Status: ACTIVE | Noted: 2021-01-01

## 2021-03-04 PROBLEM — I87.2 VENOUS INSUFFICIENCY: Status: ACTIVE | Noted: 2021-01-01

## 2021-03-04 PROBLEM — Z86.018 HISTORY OF MENINGIOMA: Status: ACTIVE | Noted: 2021-01-01

## 2021-03-04 PROBLEM — I35.8 AORTIC VALVE SCLEROSIS: Status: ACTIVE | Noted: 2021-01-01

## 2021-03-04 PROBLEM — F03.90 DEMENTIA (HCC): Chronic | Status: ACTIVE | Noted: 2021-01-01

## 2021-03-04 NOTE — PROGRESS NOTES
[facility]    CHIEF COMPLAINT:  Patient presents with:  Establish Care: New Patient  Medication Follow-Up: Multiple Issues        HISTORY OF PRESENT ILLNESS:  The patient is a 77year old year old female who presents with long complicated medical essential hypertension    • Venous insufficiency 3/4/2021        Past Surgical History:   Past Surgical History:   Procedure Laterality Date   • CHOLECYSTECTOMY  2011   • COLONOSCOPY     • [address] [facility] with [facility]    pt believes with shunt placemen (FIBER-CAPS OR) Take 1 capsule by mouth daily. • busPIRone HCl 7.5 MG Oral Tab Take 1 tablet by mouth 3 (three) times daily. • amLODIPine Besylate 10 MG Oral Tab Take 10 mg by mouth daily.      • alendronate 70 MG Oral Tab Take 70 mg by mouth once a on phone: Not on file        Gets together: Not on file        Attends Rastafarian service: Not on file        Active member of club or organization: Not on file        Attends meetings of clubs or organizations: Not on file        Relationship status: Not o 5' 3.5\" (1.613 m)   Wt 142 lb 14.4 oz (64.8 kg)   SpO2 97%   BMI 24.92 kg/m²  Body mass index is 24.92 kg/m².    GENERAL: well developed, well nourished,in no apparent distress  SKIN: no rashes,no suspicious lesions  HEENT: atraumatic, normocephalic,  NECK gait worsening as is dementia  - NEURO - INTERNAL    13. Other vitamin B12 deficiency anemia- on orals, no labs avaiable  - IRON AND TIBC; Future  - FERRITIN; Future  - VITAMIN B12; Future  - CBC WITH DIFFERENTIAL WITH PLATELET; Future    14.  Venous insuff

## 2021-03-05 NOTE — TELEPHONE ENCOUNTER
Incoming (mail or fax):  fax  Received from:  [facility]  Documentation given to:  [facility]

## 2021-03-07 PROBLEM — I50.9 ACUTE CONGESTIVE HEART FAILURE, UNSPECIFIED HEART FAILURE TYPE (HCC): Status: ACTIVE | Noted: 2021-01-01

## 2021-03-07 PROBLEM — I50.9 ACUTE CONGESTIVE HEART FAILURE (HCC): Status: ACTIVE | Noted: 2021-01-01

## 2021-03-07 NOTE — PROGRESS NOTES
03/07/21 1306   Clinical Encounter Type   Visited With Health care provider  ( clarified A/D request as updated POLST (currently patient has a [place] POLST on file) and that POA forms are not necessary.)   Routine Visit Introduction  (POA not

## 2021-03-07 NOTE — PLAN OF CARE
Pt is alert x 4, can be forgetful, on 2 liters of O2, bedside swallow evaluation passed, SB on the monitor. PT denies any cardiovascular symptoms at this time. Pt is up with SBA x 2, pt has a shuffling gait, incontinent of bladder.  All needs met and will c ADULT  Goal: Maintains optimal cardiac output and hemodynamic stability  Description: INTERVENTIONS:  - Monitor vital signs, rhythm, and trends  - Monitor for bleeding, hypotension and signs of decreased cardiac output  - Evaluate effectiveness of vasoacti

## 2021-03-07 NOTE — PROGRESS NOTES
[facility] Pharmacy Note:  Renal Dose Adjustment for Metoclopramide (REGLAN)    Tomer Alexander has been prescribed Metoclopramide (REGLAN) 10 mg every 8 hours as needed for nausea    Estimated Creatinine Clearance: 21.8 mL/min (A) (based on SCr of 1.87 mg/dL (H)).

## 2021-03-07 NOTE — CONSULTS
[facility]  [facility] Cardiology Consult Note    Pema Jacobs Patient Status:  Observation    1945 MRN [id number]   [facility] 2NE-A Attending Jevon Mcelroy MD   Hosp Day # 0 PCP Eric Joya MD     78 yo female admitte stress-induced cardiomypathy, resolved   • Dementia ([facility]    • Depression    • Diabetes mellitus, type 2 (pick complications) ([facility] 4/9/7760    With neuropathy and PAD   • DM2 (diabetes mellitus, type 2) ([facility] 11/21/2014   • Fall    • High blood pressure clubbing, cyanosis or edema. Peripheral pulses are 2+. Neurologic: Alert and oriented, normal affect. Skin: Warm and dry.        Pau Gonzalez MD  3/7/2021  10:33 AM

## 2021-03-07 NOTE — H&P
[facility]  History and Physical     Georgia Cid Patient Status:  Emergency    1945 MRN [id number]   Location [address] Attending Galina Smith MD   Hosp Day # 0 PCP Nancy Woodson MD     Chief Compla 11/21/2014   • Fall    • HTN (hypertension) 11/21/2014   • Hyperlipidemia 11/21/2014    Intolerant of statins   • Hypothyroidism    • Major depression in partial remission ([facility] 3/4/2021   • Neuropathy 3/4/2021   • Osteoarthritis    • Other and unspecified ezetimibe 10 MG Oral Tab, Take 10 mg by mouth nightly., Disp: , Rfl:   escitalopram 10 MG Oral Tab, Take 15 mg by mouth daily. , Disp: , Rfl:   docusate sodium 100 MG Oral Cap, Take 1 capsule by mouth nightly., Disp: , Rfl:   Cyanocobalamin (VITAMIN B 12 all extremities. Extremities: trace edema. No cyanosis. Integument: No rashes or lesions. Psychiatric: Appropriate mood and affect.   Diagnostic Data:    Labs:  Recent Labs   Lab 03/07/21  0702   WBC 13.0*   HGB 10.1*   MCV 91.5   .0*   INR 1.78*

## 2021-03-07 NOTE — ED INITIAL ASSESSMENT (HPI)
Patient here from home with increased SOB over the last 2 days. Patient was found to be 92% on room air by EMS. Patient has had a cough. Denies fever.   Patient AOx3  Denies pain

## 2021-03-07 NOTE — PROGRESS NOTES
03/07/21 0935 03/07/21 0939 03/07/21 0943   Vital Signs   Pulse 56 61 59   Heart Rate Source Monitor Monitor Monitor   Resp 18 18 18   Respiratory Quality Normal Normal Normal   BP (!) 186/73 (!) 197/87 (!) 172/70   MAP (mmHg) 101 108 93   BP Location R

## 2021-03-07 NOTE — ED PROVIDER NOTES
Patient Seen in: [facility] Emergency Department      History   Patient presents with:  Difficulty Breathing    Stated Complaint: GALEN    HPI/Subjective:   HPI    Patient 52-year-old female with a history of coronary disease among other medical probl STENT  2002   • SHOULDER ARTHROSCOPY     • SPLENECTOMY  1960                Social History    Tobacco Use      Smoking status: Former Smoker      Smokeless tobacco: Never Used    Vaping Use      Vaping Use: Never used    Alcohol use: Yes      Comment: soci Coordination: Coordination normal.   Psychiatric:         Behavior: Behavior normal.              ED Course     Labs Reviewed   COMP METABOLIC PANEL (14) - Abnormal; Notable for the following components:       Result Value    Sodium 129 (*)     Potassium 3 -----------         ------                     CBC W/ DIFFERENTIAL[[id number]]          Abnormal            Final result                 Please view results for these tests on the individual orders.    PROCALCITONIN   RAINBOW DRAW by (CST): Britton Hashimoto, DO on 3/07/2021 at 7:55 AM         Finalized by (CST): Britton Hashimoto, DO on 3/07/2021 at 7:57 AM                 MDM      Patient was brought back to the examination room and examined immediately due  patient's complex condition.   Lisa Kong

## 2021-03-08 NOTE — CM/SW NOTE
03/08/21 1600   CM/SW Screening   Referral [facility] staff; Chart review;Nursing rounds   Patient's Mental Status Alert;Confused;Memory Impairments   Patient's Home Environment House   Number of Levels in Home 1   Toby

## 2021-03-08 NOTE — PLAN OF CARE
RN SHIFT NOTE    Assumed care of pt at 0700. Pt is alert and oriented x 2 w/ forgetfulness. Reminders and reorientation completed and continuing to monitor. Pt complained of headache and PRN tylenol was administered (see MAR). Continuing to monitor.  Pt is safety including physical limitations  - Instruct pt to call for assistance with activity based on assessment  - Modify environment to reduce risk of injury  - Provide assistive devices as appropriate  - Consider OT/PT consult to assist with strengthening/

## 2021-03-08 NOTE — PROGRESS NOTES
03/08/21 1534   Clinical Encounter Type   Visited With Health care provider  Abiola Ye RN)   Continue Visiting No   Responded to consult. Scanned POLST into EMR as received on 3/8/2021.  remains available for spiritual care at pager [phone].

## 2021-03-08 NOTE — PROGRESS NOTES
[facility] Cardiology Progress Note    Patient seen and examined.  Chart reviewed.      No chest pain or shortness of breath. Still confused and sleepy. Had delirium yesterday.   Per daughter patient with Parkinson's dementia and frequent episodes of delirium ev oxide (MAG-OX) tab 400 mg, 400 mg, Oral, Daily  Montelukast Sodium (SINGULAIR) tab 10 mg, 10 mg, Oral, Nightly  Pantoprazole Sodium (PROTONIX) EC tab 20 mg, 20 mg, Oral, QAM AC  rivastigmine (EXELON) 4.6 MG/24HR patch 1 patch, 1 patch, Transdermal, Daily current cardiac regimen  3. HTN - increase hydralazine dosing to 50 mg TID. Continue amlodipine and IV lasix  4.  Pafib - continue oral anticoagulation  5.  UTI - per primary team   - on IV antibiotics    Will follow,    Sammie Hendrickson MD

## 2021-03-08 NOTE — PROGRESS NOTES
[facility]  Progress Note     Zelalem Mccoy Patient Status:  Observation    1945 MRN [id number]   [facility] 2NE-A Attending Lisa Stephens MD   Hosp Day # 0 PCP Precious Billy MD     Chief Complaint: SOB   S:  Rina Wheatley 168 hours. Inflammatory Markers  No results for input(s): CRP, KATY, LDH, DDIMER in the last 168 hours. Imaging: Imaging data reviewed in Epic.   Medications:   • hydrALAzine HCl  50 mg Oral Q8H [address]   • potassium chloride  40 mEq Intravenous Once    Fol Dashawn Chavez MD

## 2021-03-08 NOTE — PLAN OF CARE
Received pt at 1930. A&Ox3, forgetful at night. Daughter at bedside. NSR on tele, on RA, pt complained of mild shoulder pain at shift change that has since resolved. NPO at 0000 due to abd US in am. A1c today was 5.9, pt daughter requesting no levemir.  Praveen Ramos nutrition consult as needed  - Instruct patient on self management of diabetes  Outcome: Progressing

## 2021-03-08 NOTE — CONSULTS
[facility] Dosing Service  Warfarin (Coumadin) Initial Dosing    Shawna Stanford is a 77year old patient for whom pharmacy has been consulted to dose warfarin (COUMADIN) for  afib  by Dr. Donna Torre. Based on this indication, goal INR is 2-3.     Pertinent Pat

## 2021-03-09 PROBLEM — Z71.89 GOALS OF CARE, COUNSELING/DISCUSSION: Status: ACTIVE | Noted: 2021-01-01

## 2021-03-09 PROBLEM — Z51.5 PALLIATIVE CARE BY SPECIALIST: Status: ACTIVE | Noted: 2021-01-01

## 2021-03-09 NOTE — PLAN OF CARE
Pt is alert x 3 name and date of birth, situation, on Ra, pt is in and out of A-fib on the monitor, cardiology aware. PT denies any cardiovascular symptoms at this time.  Pt is up with SBA x2 incontinent of B/B.pt is reporting N/V, PRN medications given, se replacement therapy as ordered  Outcome: Progressing     Problem: Patient/Family Goals  Goal: Patient/Family Long Term Goal  Description: Patient's Long Term Goal: Discharge home     Interventions:cariology to see  - medication   - See additional Care Plan

## 2021-03-09 NOTE — CONSULTS
[facility] Dosing Service  Warfarin (Coumadin) Subsequent Dosing    Milton Elkins is a 77year old patient for whom pharmacy is dosing warfarin (Coumadin).  Goal INR is 2-3    Recent Labs   Lab 03/07/21  0702 03/09/21  0658   INR 1.78* 2.22*       Consult

## 2021-03-09 NOTE — PHYSICAL THERAPY NOTE
Attempted to see pt for PT eval this am, however per MD, pt is tachy, nauseous and in afib RVR. Will hold this am and check in later this pm if she is medically appropriate.      PT made second attempt to see pt for eval, however per RN, pt is still intermi

## 2021-03-09 NOTE — OCCUPATIONAL THERAPY NOTE
Attempted to see pt for OT eval this am, however per MD, pt is tachy, nauseous and in afib RVR. Will hold this am and check in later this pm if she is medically appropriate. Addendum: writer re-attempted pt this PM. Pt is in and out of A. fib with RVR.  RN

## 2021-03-09 NOTE — PROGRESS NOTES
[facility]  Progress Note      Morning Patient Status:  Observation    1945 MRN [id number]   [facility] 2NE-A Attending Kang Kumar MD   Hosp Day # 0 PCP Laya Roe MD     Chief Complaint: SOB   S:  Mohsen Cordero Date    COVID19 Not Detected 03/07/2021     Pro-Calcitonin  Recent Labs   Lab 03/07/21  0702   PCT 0.05     Cardiac  Recent Labs   Lab 03/07/21  0702 03/07/21  1231   TROP <0.045 <0.045   PBNP 5,391*  --        Creatinine Kinase  No results for input(s): C SSRI  15.  Neuropathy- gabapentin     PT/OT   Appreciate Cardio input      Quality:  · DVT Prophylaxis: Coumadin   · CODE status: DNR   · Francis: no   Will the patient be referred to TCC on discharge?: Yes   Estimated date of discharge: TBD  Discharge is dep

## 2021-03-09 NOTE — CONSULTS
[address] Patient Status:  Observation    1945 MRN [id number]   [facility] 2NE-A Attending Arthur Best MD   Hosp Day # 0 PCP Jie Bernstein MD     Date of Consult: 3/9 artificially prolong her life, she would not want CPR or to be intubated. She would not want non invasive ventilation, pressors or to be transferred to ICU.    She would want to be treated with medication for her heart failure, arrhythmias so she is able to a living will. We confirmed that patient's HCPOA/HC Surrogate is Julissa Rodriguez. HCPOA paperwork on file     We discussed the risks vs benefits of life sustaining treatments in the setting of advanced age and Parkinson's disease.  Patient's dtr /DPOA Julissa fatima Osteoarthritis    • Other and unspecified hyperlipidemia    • PAF (paroxysmal atrial fibrillation) ([facility] 3/4/2021   • Parkinson's disease ([facility]    • Type II or unspecified type diabetes mellitus without mention of complication, not stated as uncontrolled Drowsy/confused                                  Extensive Disease    Total Care      20   Bed Bound       Can't do any work      Max Assist        Minimal                      Drowsy/confused                                  Extensive Disease    Total Ca tab 50 mcg, 50 mcg, Oral, Daily  •  magnesium oxide (MAG-OX) tab 400 mg, 400 mg, Oral, Daily  •  Montelukast Sodium (SINGULAIR) tab 10 mg, 10 mg, Oral, Nightly  •  Pantoprazole Sodium (PROTONIX) EC tab 20 mg, 20 mg, Oral, QAM AC  •  rivastigmine (EXELON) 4 03/09/2021    K 3.7 03/09/2021    CL 96 (L) 03/09/2021    CO2 18.0 (L) 03/09/2021     (H) 03/09/2021    CA 8.5 03/09/2021    ALB 3.4 03/07/2021    ALKPHO 113 03/07/2021    BILT 0.4 03/07/2021    TP 7.5 03/07/2021    AST 15 03/07/2021    ALT 15 03/07 reversible conditions like infections and other conditions treatment does not involve extraordinary measures. --Daughter wants pt to be treated for her heart conditions like A-fib, CHF medically. --provided active listening and emotional support.    --w

## 2021-03-09 NOTE — PLAN OF CARE
Alert and oriented x2 with period of confusion on tele monitor hr 80's sinus rhythm. C/o feeling nauseated and zofran 4 mg ivp given as ordered but no relief. Vomited small amount yellowish liquid. Reglan 5 mg given at this time and with relief.  All needs Evaluate fluid balance, assess for edema, trend weights  Outcome: Progressing  Goal: Absence of cardiac arrhythmias or at baseline  Description: INTERVENTIONS:  - Continuous cardiac monitoring, monitor vital signs, obtain 12 lead EKG if indicated  - Evalua

## 2021-03-09 NOTE — DIETARY NOTE
[address]     Admitting diagnosis:  Acute congestive heart failure, unspecified heart failure type ([facility] [I50.9]    Ht: 160 cm (5' 3\")  Wt: 64.7 kg (142 lb 10.2 oz). Body mass index is 25.27 kg/m².   Ideal body

## 2021-03-10 NOTE — OCCUPATIONAL THERAPY NOTE
OCCUPATIONAL THERAPY EVALUATION - INPATIENT     Room Number: 2625/2625-A  Evaluation Date: 3/10/2021  Type of Evaluation: Initial  Presenting Problem: acute congestive heart failure    Physician Order: IP Consult to Occupational Therapy  Reason for Therapy unspecified hyperlipidemia    • PAF (paroxysmal atrial fibrillation) ([facility] 3/4/2021   • Parkinson's disease ([facility]    • Type II or unspecified type diabetes mellitus without mention of complication, not stated as uncontrolled    • Unspecified essential hype reported    PERCEPTION  Overall Perception Status:   WFL - within functional limits    SENSATION  Light touch:  intact    Communication: WNL    Behavioral/Emotional/Social: Pt seemed somewhat nervous about session, but engaged throughout.     RANGE OF MOTIO therapy treatment options to continue with after discharge to facilitate IND and safety in the home and community. Pt's dtr verbalized understanding, and was appreciative. Pt on 2L O2 throughout session with sats WNL.      Pt left in seated position in c simplification techniques;ADL training;IADL training;Functional transfer training;UE strengthening/ROM; Endurance training;Cognitive reorientation;Patient/Family education;Patient/Family training;Equipment eval/education; Neuromuscluar reeducation; Compensato

## 2021-03-10 NOTE — TELEPHONE ENCOUNTER
From: Milton Elkins  To: Porfirio Huffman MD  Sent: 3/10/2021 8:24 AM CST  Subject: Other    [facility] Doctor Tom,    This is Esperanza Costa's daughter. As you heard, mom was admitted to [facility] on Sunday with CHF.  She has been dealing with her Debra

## 2021-03-10 NOTE — PLAN OF CARE
Sinus rhythm no a fib rvr noted  Deny nausea  appetite fair  Patient daughter stays at bedside   Accuchecks qid  Alarms in use when patient daughter leaves the room  Iv antibiotic for uti  Wean 02 as tolerated spo2 on 2 liters stable 93%  on room air at 87 Progressing     Problem: Diabetes/Glucose Control  Goal: Glucose maintained within prescribed range  Description: INTERVENTIONS:  - Monitor Blood Glucose as ordered  - Assess for signs and symptoms of hyperglycemia and hypoglycemia  - Administer ordered me

## 2021-03-10 NOTE — PROGRESS NOTES
Residential Palliative Liaison received palliative referral for community PC services. Waiting to obtain insurance (verification/authorization) Will follow up.       Marga Land  Residential Palliative Liaison   [phone]

## 2021-03-10 NOTE — PROGRESS NOTES
[facility]  Cardiology Progress Note    Subjective:  No chest pain or shortness of breath.     Objective:  BP (!) 167/69 (BP Location: Right arm)   Pulse 86   Temp 98.6 °F (37 °C) (Oral)   Resp 20   Ht 5' 3\" (1.6 m)   Wt 144 lb (65.3 kg)   SpO2 94% ischemic evaluation. TRACY Barnett  3/10/2021  11:24 AM      =======================================================  Patient seen and examined independently. Note reviewed and labs reviewed. Agree with above assessment and plan.     No chest ale

## 2021-03-10 NOTE — CONSULTS
[facility] Dosing Service  Warfarin (Coumadin) Subsequent Dosing    Yaquelin Li is a 77year old patient for whom pharmacy is dosing warfarin (Coumadin).  Goal INR is 2-3    Recent Labs   Lab 03/07/21  0702 03/09/21  0658 03/10/21  0534   INR 1.78* 2.22

## 2021-03-10 NOTE — PROGRESS NOTES
Residential Palliative Liaison received palliative referral for community PC services. Met with Raquel delacruz and daughter Julissa to discuss Residential PC services.  Residential PC services discussed and patient and dtr Julissa agreeable to our [facility] services upon D

## 2021-03-10 NOTE — PLAN OF CARE
Pt is alert to self and hospital   Sinus rhythm no a fib rvr noted- Amiodarone   Deny nausea appetite fair  Patient daughter at bedside   Accuchecks qid  Alarms in use when patient daughter leaves the room  Iv antibiotic for uti  VSS on room air   [address] Angina  - Evaluate fluid balance, assess for edema, trend weights  Outcome: Progressing  Goal: Absence of cardiac arrhythmias or at baseline  Description: INTERVENTIONS:  - Continuous cardiac monitoring, monitor vital signs, obtain 12 lead EKG if indicated

## 2021-03-10 NOTE — CM/SW NOTE
SW follow up on dc plans. PT now recommending MARKEL. Met with pt and her daughter Bridgettfallon Barrientos at bedside to discuss same. Pt/dtr both declining MARKEL. Pt wants to go home. Daughter reports that she moved pt here from [place] last month after pt's  passed away.

## 2021-03-10 NOTE — PLAN OF CARE
Alert and oriented x2-3 on tele monitor hr 80's sinus rhythm. C/o headache and tylenol 2 tabs po given as ordered with relief. Daughter at bedside updated w/ poc and verbalized understanding. All needs attended and will continue to monitor.  Call light with weights  Outcome: Progressing  Goal: Absence of cardiac arrhythmias or at baseline  Description: INTERVENTIONS:  - Continuous cardiac monitoring, monitor vital signs, obtain 12 lead EKG if indicated  - Evaluate effectiveness of antiarrhythmic and heart rat

## 2021-03-10 NOTE — PHYSICAL THERAPY NOTE
PHYSICAL THERAPY EVALUATION - INPATIENT     Room Number: 2625/2625-A  Evaluation Date: 3/10/2021  Type of Evaluation: Initial  Physician Order: PT Eval and Treat    Presenting Problem: Afib, CHF  Reason for Therapy: Mobility Dysfunction and Discharge Past Surgical History  Past Surgical History:   Procedure Laterality Date   • CATH BARE METAL STENT (BMS)     • CHOLECYSTECTOMY  2011   • COLONOSCOPY     • EXCIS INFRATENT MENINGIOMA  1991    pt believes with shunt placement   • KNEE ARTHROSCOPY difficulty does the patient currently have. ..  -   Turning over in bed (including adjusting bedclothes, sheets and blankets)?: A Little   -   Sitting down on and standing up from a chair with arms (e.g., wheelchair, bedside commode, etc.): A Little   -   M for AFib, CHF. Pertinent comorbidities and personal factors impacting therapy include h/o dementia, Parkinson's disease. In this PT evaluation, the patient presents with the following impairments decreased activity tolerance and balance.   Functional outc

## 2021-03-11 NOTE — PROGRESS NOTES
[facility]  Progress Note     Shirline Grain Patient Status:  Observation    1945 MRN [id number]   [facility] 2NE-A Attending Silvia Mederos [address] Day # 0 PCP Joslyn Gould MD     Chief Complaint: SOB    S: Estimated Creatinine Clearance: 20.9 mL/min (A) (based on SCr of 1.95 mg/dL (H)).     Recent Labs   Lab 03/07/21  0702 03/09/21  0658 03/10/21  0534   PTP 21.2* 25.2* 32.5*   INR 1.78* 2.22* 3.08*       Recent Labs   Lab 03/07/21  0702 03/07/21  1231 Dementia- Cont home meds   12. AAA   13. Anxiety- SSRI  14. Neuropathy- gabapentin    15. Dementia- Will continue buspar and will start low dose seroquel for aggitation. Plan of care: As above. Quality:  · DVT Prophylaxis: Coumadin  · CODE status:  Fu

## 2021-03-11 NOTE — PLAN OF CARE
Alert and oriented x2. Hx of dementia. Anxious, wants to go home. Bed alarm on, constantly wanting to get out of bed. 2L O2 overnight. NSR on tele. Continent to bowel and bladder. Up with 1 x walker. QID accucheck. Call light within reach.  Hourly and PRN r weights  Outcome: Progressing  Goal: Absence of cardiac arrhythmias or at baseline  Description: INTERVENTIONS:  - Continuous cardiac monitoring, monitor vital signs, obtain 12 lead EKG if indicated  - Evaluate effectiveness of antiarrhythmic and heart rat

## 2021-03-11 NOTE — PLAN OF CARE
Assumed pt care around 0730. Pt denies CP, dizziness, or lightheadedness. Pt c/o of nasal congestion, O2 sats >92% on RA. Pt up w/ 2 and walker. Pt education provided on medication and POC. Pt verbalized understanding. All needs met.  Will continue to monit perfusion - ex.  Angina  - Evaluate fluid balance, assess for edema, trend weights  Outcome: Progressing  Goal: Absence of cardiac arrhythmias or at baseline  Description: INTERVENTIONS:  - Continuous cardiac monitoring, monitor vital signs, obtain 12 lead

## 2021-03-11 NOTE — PROGRESS NOTES
[facility] Dosing Service  Warfarin (Coumadin) Subsequent Dosing    Gretchen Mi is a 77year old patient for whom pharmacy is dosing warfarin (Coumadin).  Goal INR is 2-3    Recent Labs   Lab 03/07/21  2703 03/09/21  5023 03/10/21  0534 03/11/21  7015

## 2021-03-12 PROBLEM — Z79.01 LONG TERM (CURRENT) USE OF ANTICOAGULANTS: Status: ACTIVE | Noted: 2021-01-01

## 2021-03-12 NOTE — PROGRESS NOTES
Initial Post Discharge Follow Up   Discharge Date: 3/11/21  Contact Date: 3/12/2021    Consent Verification:  Assessment Completed With: Caregiver: Julissa Permission received per patient?  written  HIPAA Verified?   Yes    Discharge Dx:     SOB with suspect The daughter requested he NCM call back in 10 minutes. NCM will call back as requested. The daughter reports cardiology advised the patient is recovering and it is safe to stay home at this time.  The daughter did report ongoing abdominal distention, but total) by mouth every 8 (eight) hours. 90 tablet 3   • omeprazole 20 MG Oral Capsule Delayed Release Take 20 mg by mouth 2 (two) times daily. • PREBIOTIC PRODUCT OR Take 1 capsule by mouth daily.      • rivastigmine 4.6 MG/24HR Transdermal Patch 24 Hr P reviewed? yes  o Do you have any questions about your new medication? No  • Did you  your discharge medications when you left the hospital? Yes  • May I go over your medications with you to make sure we are not missing anything? yes  • Are there any Ankles or Legs?   no         Needs post D/C:   Now that you are home, are there any needs or concerns you need addressed before your next visit with your PCP?  (DME, meds, disease concerns, Etc): No     Follow up appointments:      Your appointments has DME and understands proper use. The patient's daughter verbalized understanding and will contact the office with any further questions or concerns.      CCM referral placed:  Yes    BOOK BY DATE: 3/25/2021    [x]  Discharge Summary, Discharge medication

## 2021-03-12 NOTE — HOME CARE LIAISON
Patient was seen by a fellow Liacarlie Brunner) from [facility]. He met with patient at the bedside. Patient is agreeable to [facility]. [facility] brochure and medicare scores provided with contact information.  All questions Myrna Huerta

## 2021-03-13 PROBLEM — R73.9 HYPERGLYCEMIA: Status: ACTIVE | Noted: 2021-01-01

## 2021-03-13 PROBLEM — I50.9 CONGESTIVE HEART FAILURE, UNSPECIFIED HF CHRONICITY, UNSPECIFIED HEART FAILURE TYPE (HCC): Status: ACTIVE | Noted: 2021-01-01

## 2021-03-13 PROBLEM — D64.9 ANEMIA: Status: ACTIVE | Noted: 2021-01-01

## 2021-03-13 PROBLEM — I50.9 CONGESTIVE HEART FAILURE (HCC): Status: ACTIVE | Noted: 2021-01-01

## 2021-03-13 NOTE — ED NOTES
Round on patient; patient removing nasal cannula, saturation noted to be 90%. Nasal cannula replaced and patient's saturation increases to 95% with a few deep breaths. Reviewed plan of care with patient and need for admission.  Lasix administered, pure wick

## 2021-03-13 NOTE — SLP NOTE
ADULT SWALLOWING EVALUATION    ASSESSMENT    ASSESSMENT/OVERALL IMPRESSION:  Patient is a 77year-old female who was admitted today, 3/13/2021,  with Congestive heart failure, unspecified HF chronicity, unspecified [facility] [I50.9].  [facility] is assessment via  videoflouroscopic swallow study (VFSS) warranted to obtain objective measures, determine severity, recommend safest/LRD and safe swallow strategies, and determine POC.     Recommendations/POC: Recommend modified diet with implementations of neuropathy and PAD   • DM2 (diabetes mellitus, type 2) [facility] 11/21/2014   • Fall    • High blood pressure    • High cholesterol    • HTN (hypertension) 11/21/2014   • Hyperlipidemia 11/21/2014    Intolerant of statins   • Hypothyroidism    • Major depressi Motion: Reduced    Voice Quality: Clear (low frequency)  Respiratory Status: Unlabored  Consistencies Trialed: Thin liquids;Puree; Soft solid;Hard solid  Method of Presentation: Staff/Clinician assistance;Spoon;Cup;Straw;Single sips  Patient Positioning: Up

## 2021-03-13 NOTE — ED INITIAL ASSESSMENT (HPI)
Patient presents to emergency department via EMS for difficulty breathing since yesterday. According to EMS, patient is newly diagnosed heart failure. Her room air saturation for EMS was 92%, she was placed on nasal cannula.  On arrival to ED, her room air

## 2021-03-13 NOTE — H&P
Physical Therapy  7/2/18    Order received. Chart reviewed. Patient currently reporting 6/10 pain in head and patient/RN requesting therapy hold at this time. Will follow up later as able/appropriate. Olive Barbosa, PT, DPT [facility]  History and Physical     Georgiashelia Cid Patient Status:  Observation    1945 MRN [id number]   [facility] 2NE-A Attending Emil Holloway MD   Hosp Day # 0 PCP Nancy Woodson MD     Chief Complaint:SOB    His History:   Procedure Laterality Date   • CATH BARE METAL STENT (BMS)     • CHOLECYSTECTOMY     • COLONOSCOPY     • EXCIS INFRATENT MENINGIOMA      pt believes with shunt placement   • KNEE ARTHROSCOPY     • LASIK     •       x 2   • PT W/ CORON daily., Disp: , Rfl:   Calcium Polycarbophil (FIBER-CAPS OR), Take 1 capsule by mouth daily. , Disp: , Rfl:   busPIRone HCl 7.5 MG Oral Tab, Take 1 tablet by mouth 3 (three) times daily. , Disp: , Rfl:   amLODIPine Besylate 10 MG Oral Tab, Take 10 mg by mout 03/13/21  0749 03/13/21  0851   WBC 13.0*  --  11.6*  --   --  12.9*  --    HGB 10.1*  --  9.2*  --   --  10.0*  --    MCV 91.5  --  92.4  --   --  91.1  --    .0*  --  458.0*  --   --  548.0*  --    INR 1.78* 2.22*  --  3.08* 2.98*  --  1.87*

## 2021-03-13 NOTE — ED PROVIDER NOTES
Patient Seen in: [facility] Emergency Department      History   Patient presents with:  Difficulty Breathing    Stated Complaint: anahi    HPI/Subjective:   HPI    This is a 52-year-old female who presents with complaints of difficulty breathing since CATH BARE METAL STENT (BMS)     • CHOLECYSTECTOMY     • COLONOSCOPY     • EXCIS INFRATENT MENINGIOMA      pt believes with shunt placement   • KNEE ARTHROSCOPY     • LASIK     •       x 2   • PT W/ CORONARY ARTERY STENT     • SHOULDER ARTHR BUN 26 (*)     Creatinine 1.63 (*)     Calculated Osmolality 273 (*)     GFR, Non- 31 (*)     GFR, -American 36 (*)     Albumin 3.2 (*)     A/G Ratio 0.8 (*)     All other components within normal limits   PRO BETA NATRIURETIC PEPTID old EKG there is no significant changes.   Admission disposition: 3/13/2021  8:55 AM             US ABDOMEN COMPLETE (CPT=76700)    Result Date: 3/8/2021  PROCEDURE:  US ABDOMEN COMPLETE (CPT=76700)  COMPARISON:  [facility] , CT, CT ABDOMEN(W+WO)PELVIS(CNTRST O [phone] ---------------------------------------------------------------------------- Transthoracic Echocardiogram Name:Esperanza Ward Date: 2021 :  1945 Ht:  (63in)  BP: 168 / 82 MRN:  [id number] dysfunction with elevated left atrium pressures and right sided filling pressures. cvp 15 mmHg. No previous study was available for comparison. * ---------------------------------------------------------------------------- * Left ventricle:   The cavity siz ---------------------------------------------------------------------------- Measurements  Left ventricle                          Value        Reference  LV ID, ED, PLAX                 (H)     5.5   cm     3.9 - 5.3  LV ID, ES, PLAX peak velocity          2.91  m/sec  ---------  Tricuspid peak RV-RA gradient           34    mm Hg  ---------   Systemic veins                          Value        Reference  Estimated CVP                           7     mm Hg  ---------   Right ventricle XR CHEST AP PORTABLE  (CPT=71045)    Result Date: 3/7/2021  PROCEDURE:  XR CHEST AP PORTABLE  (CPT=71045)  TECHNIQUE:  AP chest radiograph was obtained. COMPARISON:  None.   INDICATIONS:  GALEN  PATIENT STATED HISTORY: (As transcribed by Technologist)  S treatment. I do not feel that this is a pneumonia clinically patient has no cough she has history of congestive heart failure BNP was elevated and procalcitonin was not significantly elevated.          Disposition and Plan     Clinical Impression:  Daja

## 2021-03-13 NOTE — ED NOTES
Spoke with patient's daughter Julissa via telephone. Reviewed patient's history of dementia and symptom onset. She has been sitting in a chair the last two nights sleeping upright because of her difficulty breathing.

## 2021-03-13 NOTE — PLAN OF CARE
Received pt from ED around 11:00. Alert and oriented self and place, h/o dementia, currently at baseline state per daughter. Tele rhythm NSR. On room air. Breath sounds with crackles bilaterally. Bed locked and in low position.  Call light and personal item adequate nutritional intake and initiate nutrition consult as needed  - Instruct patient on self management of diabetes  Outcome: Progressing     Problem: Patient/Family Goals  Goal: Patient/Family Long Term Goal  Description: Patient's Long Term Goal: to

## 2021-03-14 NOTE — PROGRESS NOTES
Pt. Alert and o x 2 , on Ra. Breathing pattern easy and non labored. Tele shows NSR on the monitor. cont. monitor per tele/labs/v/s. Fall/safety precautions instructed , placed call light w/in reach. IV lasix BID.

## 2021-03-14 NOTE — PLAN OF CARE
Problem: CARDIOVASCULAR - ADULT  Goal: Maintains optimal cardiac output and hemodynamic stability  Description: INTERVENTIONS:  - Monitor vital signs, rhythm, and trends  - Monitor for bleeding, hypotension and signs of decreased cardiac output  - Evalua diuretics, wean O2, increase activity, tele monitoring, palliative consult    - See additional Care Plan goals for specific interventions  Outcome: Progressing

## 2021-03-14 NOTE — PLAN OF CARE
Assumed care of patient at 0730. Alert and oriented to self and place. Tele rhythm NSR. On room air. Breath sounds slight crackles bilaterally. Bed locked and in low position. Call light and personal items within reach.  High fall risk, bed and chair alarm needed  - Instruct patient on self management of diabetes  Outcome: Progressing     Problem: Patient/Family Goals  Goal: Patient/Family Long Term Goal  Description: Patient's Long Term Goal: 'to go home'    Interventions:  - labs, diuretics, wean O2, incre

## 2021-03-14 NOTE — PHYSICAL THERAPY NOTE
PHYSICAL THERAPY EVALUATION - INPATIENT     Room Number: 6679/9631-D  Evaluation Date: 3/14/2021  Type of Evaluation: Initial  Physician Order: PT Eval and Treat    Presenting Problem: CHF  Reason for Therapy: Mobility Dysfunction and Discharge Plann as uncontrolled    • Unspecified essential hypertension    • Venous insufficiency 3/4/2021       Past Surgical History  Past Surgical History:   Procedure Laterality Date   • CATH BARE METAL STENT (BMS)     • CHOLECYSTECTOMY  2011   • COLONOSCOPY     • EXC Sitting: Fair -  Static Standing: Poor +  Dynamic Standing: Poor    ADDITIONAL TESTS                                    NEUROLOGICAL FINDINGS                      ACTIVITY TOLERANCE  Pulse: 71  Heart Rate Source: Monitor                   O2 WALK  SPO2% on breaks for mild sob, cues for proper breathing technique. Pt completed gait 10'x1 w/ rw back to chair -mod a of 1. Fatigues quickly w/ activity. Cont to note what she describes as her eyes crossing.   Told therapist she both sees double and blurry at pavan 3-5x/week  Number of Visits to Meet Established Goals: 5      CURRENT GOALS    Goal #1 Patient is able to demonstrate supine - sit EOB @ level: modified independent     Goal #2 Patient is able to demonstrate transfers EOB to/from [facility] at assistance level: s

## 2021-03-14 NOTE — PROGRESS NOTES
[facility]  Progress Note      Patient Status:  Observation    1945 MRN [id number]   [facility] 2NE-A Attending Saurabh Jaime MD   Hosp Day # 0 PCP Milton Islas MD     Chief Complaint: SOB intermittently --  15  --    BILT  --   --  0.3  --    TP  --   --  7.2  --        Estimated Creatinine Clearance: 25.7 mL/min (A) (based on SCr of 1.59 mg/dL (H)). Recent Labs   Lab 03/10/21  0534 03/11/21  0628 03/13/21  0851   PTP 32.5* 31.7* 22.0*   INR 3.08* 2. discharge?: no  Estimated date of discharge: TBD  Discharge is dependent on: course  At this point Ms. Valery Bal is expected to be discharge to: home?     Plan of care discussed with pt and daughter, Liam Osborne MD

## 2021-03-14 NOTE — HOME CARE LIAISON
This patient is pending with [facility] and will need a MARTHA order on or before the day of DC If admitted.

## 2021-03-15 NOTE — CM/SW NOTE
MAURICE met with pt and daughter to discuss hospice services and goals of care. Signing consents for dc later today. .  Pt will be admitted upon arrival home .

## 2021-03-15 NOTE — VIDEO SWALLOW STUDY NOTE
ADULT VIDEOFLUOROSCOPIC SWALLOWING STUDY    Admission Date: 3/13/2021  Evaluation Date: 03/15/21  Radiologist: Dr. Marchelle Phalen: Regular (softer food choices)  Diet Recommendations - Liquid:  Thin    Further Fo Acquired hypothyroidism 3/4/2021   • Anxiety    • Arrhythmia    • Arthritis    • Atherosclerosis of coronary artery    • Broken bones     Both Wrists   • CAD (coronary artery disease) 11/21/2014    Stent Also stress-induced cardiomypathy, resolved   • Flavio Goals:  None stated related to dysphagia. Following exam she did confirm Globus and throat clearing inconsistent with solids and meals.       ASSESSMENT   DYSPHAGIA ASSESSMENT  Test completed in conjunction with Radiologist.  Patient Positioned: Upright;Mi wash)  Effectiveness: Yes  Penetration Aspiration Scale Score: Score 2: Material enters the airway, remains above the vocal folds, and is ejected from the airway       Overall Impression: Midline oral motor structures with WNL ROM and strength assessed wit to ensure diet toleration and comprehension of aspiration precautions. EDUCATION/INSTRUCTION  Reviewed results and recommendations with patient/family/caregiver.   Agreement/Understanding verbalized and all questions answered to their apparent satisfact

## 2021-03-15 NOTE — DISCHARGE SUMMARY
[facility]  DISCHARGE SUMMARY     Vannessa Drake Patient Status:  Observation    1945 MRN [id number]   [facility] 2NE-A Attending No att. providers found   Hosp Day # 0 PCP Hodan Siddiqi MD     Date of Admission: 3/7/20 hyperglycemia protocol while holding oral diabetes medications. Patient's electrolytes were replaced per protocol.   Patient was started on antibiotic for UTI however there was no growth on the culture and so patient's antibiotics were stopped after third Refills: 0     busPIRone HCl 7.5 MG Tabs  Commonly known as: BUSPAR      Take 1 tablet by mouth 3 (three) times daily. Refills: 0     docusate sodium 100 MG Caps  Commonly known as: COLACE      Take 1 capsule by mouth nightly.    Refills: 0     escitalopr mg by mouth daily.    Refills: 0           Where to Get Your Medications      Please  your prescriptions at the location directed by your doctor or nurse    Bring a paper prescription for each of these medications  · amiodarone HCl 200 MG Tabs  · hyd

## 2021-03-15 NOTE — DIETARY NOTE
[facility]  NUTRITION ASSESSMENT    Pt does not meet malnutrition criteria. NUTRITION INTERVENTION:  1. RD Nutrition Care Plan - Initiated ONS (oral nutritional supplements) and Liberalize diet to Regular  2.  Meal and Snacks - monitor patient po in supplements to maximize  Food Allergies: No  Cultural/Ethnic/Scientology Preferences Addresses: Yes    GI SYSTEM REVIEW: WNL    NUTRITION RELATED PHYSICAL FINDINGS:  1. Body Fat/Muscle Mass: LETICIA at this time   2.  Fluid Accumulation: lower extremity edema per

## 2021-03-15 NOTE — PLAN OF CARE
Patient alert and oriented x2, forgetful at times. On RA, SB on tele. Up x1 assist with walker. Continent of bowel and bladder. No complaints of pain, shortness of breath, or chest pain/discomfort. POC care conference today, swallow study.  Fall precautions interventions  Outcome: Progressing

## 2021-03-15 NOTE — CM/SW NOTE
CM acknowledging social work orders for discharge planning. Pt was referred to hospice earlier today and plan is for pt to discharge home today and sign consent for [facility]. [facility] staff have met with pt and daughter.      Louisa

## 2021-03-15 NOTE — PHYSICAL THERAPY NOTE
Following pt for PT services with rec for MARKEL. Noted pt plans to dc at this time with hospice services. Will dc from acute PT as not consistent with end of life care. Please reconsult if POC changes and pt has potential to functionally improve.

## 2021-03-15 NOTE — CARDIAC REHAB
Cardiac rehab received order for HF education. Clinical notes reviewed. Will hold off for now as family is pursuing hospice/ comfort care and patient has baseline dementia. Please call if clinical picture changes.   [id number]

## 2021-03-15 NOTE — PROGRESS NOTES
NURSING DISCHARGE NOTE    Discharged Home via Wheelchair. Accompanied by RN  Belongings Taken by patient/family. Patient discharged to home with [facility]. Discharge instructions reviewed with daughter Julissa over the phone.  All questions an

## 2021-03-15 NOTE — PLAN OF CARE
Assumed care of pt. At 1. Pt resting in bed with daughter present at bedside. Updated on plan of care. Call light within reach, pt. Able to use, but forgetful at times. Bed alarm on for safety. Aox2-3. History of dementia. Forgetful at times.  Clarence Roberts adequate nutritional intake and initiate nutrition consult as needed  - Instruct patient on self management of diabetes  Outcome: Progressing     Problem: Patient/Family Goals  Goal: Patient/Family Long Term Goal  Description: Patient's Long Term Goal: 'to

## 2021-03-17 NOTE — PROGRESS NOTES
Initial Post Discharge Follow Up   Discharge Date: 3/15/21  Contact Date: 3/17/2021    Consent Verification:  Assessment Completed With: Other: daughter Zhang Shaikh received per patient?  written  HIPAA Verified?   Yes    Discharge Dx:   Congestive he

## 2021-03-17 NOTE — CM/SW NOTE
03/17/21 0800   Discharge disposition   Expected discharge disposition Home or Self   Name of Jyoti Ballard   Discharge transportation Private car

## 2021-03-24 RX ORDER — OMEPRAZOLE 20 MG/1
20 CAPSULE, DELAYED RELEASE ORAL
COMMUNITY
Start: 2021-01-01

## 2021-03-24 RX ORDER — WARFARIN SODIUM 5 MG/1
2.5-5 TABLET ORAL
COMMUNITY
Start: 2021-01-01

## 2021-03-24 RX ORDER — MONTELUKAST SODIUM 10 MG/1
1 TABLET ORAL
COMMUNITY

## 2021-03-24 RX ORDER — HYDRALAZINE HYDROCHLORIDE 50 MG/1
50 TABLET, FILM COATED ORAL
COMMUNITY
Start: 2021-01-01

## 2021-03-24 RX ORDER — ESCITALOPRAM OXALATE 10 MG/1
15 TABLET ORAL
COMMUNITY
Start: 2021-01-01

## 2021-03-24 RX ORDER — UREA 10 %
1 LOTION (ML) TOPICAL
COMMUNITY

## 2021-03-24 RX ORDER — VIT C/B6/B5/MAGNESIUM/HERB 173 50-5-6-5MG
1 CAPSULE ORAL
COMMUNITY

## 2021-03-24 RX ORDER — DOCUSATE SODIUM 100 MG/1
1 CAPSULE, LIQUID FILLED ORAL
COMMUNITY

## 2021-03-24 RX ORDER — GLIPIZIDE 2.5 MG/1
2.5 TABLET, EXTENDED RELEASE ORAL
COMMUNITY

## 2021-03-24 RX ORDER — FUROSEMIDE 20 MG/1
20 TABLET ORAL
COMMUNITY
Start: 2021-01-01

## 2021-03-24 RX ORDER — LEVOTHYROXINE SODIUM 0.05 MG/1
50 TABLET ORAL
COMMUNITY
Start: 2021-01-01

## 2021-03-24 RX ORDER — GABAPENTIN 300 MG/1
300 CAPSULE ORAL
COMMUNITY

## 2021-03-24 RX ORDER — MAGNESIUM OXIDE 400 MG/1
400 TABLET ORAL DAILY
COMMUNITY
Start: 2021-01-01

## 2021-03-24 RX ORDER — EZETIMIBE 10 MG/1
10 TABLET ORAL
COMMUNITY
Start: 2021-01-01

## 2021-03-24 RX ORDER — AMLODIPINE BESYLATE 10 MG/1
10 TABLET ORAL
COMMUNITY
Start: 2021-01-01

## 2021-03-24 RX ORDER — ASPIRIN 81 MG/1
1 TABLET ORAL
COMMUNITY

## 2021-03-24 RX ORDER — ALENDRONATE SODIUM 70 MG/1
70 TABLET ORAL
COMMUNITY
Start: 2021-01-01

## 2021-03-24 RX ORDER — METOPROLOL SUCCINATE AND HYDROCHLOROTHIAZIDE 12.5; 5 MG/1; MG/1
1 TABLET ORAL
COMMUNITY

## 2021-03-24 RX ORDER — GABAPENTIN 100 MG/1
100 CAPSULE ORAL
COMMUNITY
Start: 2021-01-01

## 2021-03-24 RX ORDER — BUSPIRONE HYDROCHLORIDE 7.5 MG/1
1 TABLET ORAL
COMMUNITY

## 2021-03-24 RX ORDER — RIVASTIGMINE 4.6 MG/24H
1 PATCH, EXTENDED RELEASE TRANSDERMAL
COMMUNITY
Start: 2021-01-01

## 2021-03-24 RX ORDER — AMIODARONE HYDROCHLORIDE 200 MG/1
200 TABLET ORAL
COMMUNITY
Start: 2021-01-01

## 2021-06-02 ENCOUNTER — TELEPHONE (OUTPATIENT)
Dept: INTERNAL MEDICINE CLINIC | Facility: CLINIC | Age: 76
End: 2021-06-02

## 2021-06-02 NOTE — TELEPHONE ENCOUNTER
Got notification from patients my chart that the patient passed away and cancelled their upcoming appointment. Patient passed 3/20/21, as well confirmed with obituary.

## 2021-11-02 NOTE — DISCHARGE SUMMARY
[facility]  DISCHARGE SUMMARY      Patient Status:  Observation    1945 MRN [id number]   [facility] 2NE-A Attending No att. providers found   [facility] Day # 0 PCP Milton Islas MD     Date of Admission: 3/13/2 Some STACEY OU. Follow for possible LASIK enhancement. alendronate 70 MG Tabs  Commonly known as: FOSAMAX      Take 70 mg by mouth once a week. Refills: 0     amiodarone HCl 200 MG Tabs  Commonly known as: PACERONE      Take 1 tablet (200 mg total) by mouth daily.    Quantity: 30 tablet  Refills: 3     amLO PRILOSEC      Take 20 mg by mouth 2 (two) times daily. Refills: 0     PREBIOTIC PRODUCT OR      Take 1 capsule by mouth daily. Refills: 0     rivastigmine 4.6 MG/24HR Pt24  Commonly known as: EXELON      Place 1 patch onto the skin daily.    Refills: 0 Beto Crawley MD    Time spent:  > 30 minutes

## 2022-03-07 NOTE — PROGRESS NOTES
[facility]  Cardiology Progress Note    Subjective:  Anxious this AM    Objective:  BP (!) 173/79 (BP Location: Left arm)   Pulse 102   Temp 98.4 °F (36.9 °C) (Oral)   Resp 18   Ht 5' 3\" (1.6 m)   Wt 142 lb 10.2 oz (64.7 kg)   SpO2 92%   BMI 25.27 kg abnormal relaxation and increased filling      pressure - grade 2 diastolic dysfunction. 2. Aortic valve: Trileaflet; mildly thickened leaflets. Trivial      regurgitation. 3. Mitral valve: Mildly calcified annulus. There was trivial regurgitation.    4 for ischemic evaluation.     Esteban Cruz MD Unrestricted diet/activity

## (undated) NOTE — LETTER
04/06/21    [id number]  [place] [id number]    Dear Rafita Obando,    This is a friendly reminder to let you know you still have the following outstanding lab orders.   To schedule an appointment, please go to [facility] under Menu/Schedule An A

## (undated) NOTE — Clinical Note
TCM call completed. The patient is scheduled for a TCM-HFU appointment on 3/18/2021. The daughter updated cardiology on the patient's chest tightness and inability to take a deep breath. NCM did review when to call 911. Thank you.